# Patient Record
Sex: FEMALE | Race: WHITE | NOT HISPANIC OR LATINO | Employment: OTHER | URBAN - METROPOLITAN AREA
[De-identification: names, ages, dates, MRNs, and addresses within clinical notes are randomized per-mention and may not be internally consistent; named-entity substitution may affect disease eponyms.]

---

## 2018-08-17 NOTE — PATIENT DISCUSSION
Discussed Results from exam and OCT with DR. Lema today.  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  Condition is not an emergency. Appointment made for Monday for retinal consult.

## 2018-08-20 NOTE — PATIENT DISCUSSION
8/24/18-Explained post-operative information to the patient. The patient will have a red and sore eye for about a week. This is completely normal. If the pain becomes severe, we ask the patient to come back in immediately, 24/7 ER discussed. We provided post-operative drops with instructions and an eye patch for the patient to wear at night. The patient will begin Moxifloxacin four times a day and Pred Forte every two hours. We ask that the patient avoid excess water in the eye when showering and try to avoid swimming. This will help reduce possibility of infection. We ask that the patient wear the eye patch we give them at night while sleeping to avoid abrasions and excess rubbing of the eye while sleeping. We ask the patient to sleep on their side or stomach while in bed for the first night so the air bubble is positioned correctly. We also ask that the patient the patient not participate in strenuous activity for the next few weeks and not lift more than 20 lbs for another week or two.

## 2018-08-20 NOTE — PATIENT DISCUSSION
RD is present. Options from observation, surgery and second opinion discussed. Risks including not limited to infection, bleeding, glaucoma, cataract, IOL damage, PVR, retinal scars, multiple surgeries, chronic pain, cosmetic changes, loss of vision/eye discussed. Recommended surgery to resolve RD. The possibility of multiple surgeries emphasized. Patient wishes to proceed. Risks of infection, bleeding, tear, detachment, ocular irritations and cosmetic changes among others discussed. Thorough hygiene and antibiotic use discussed. Required head positioned discussed.  Surgery scheduled  for 8/23/18, patient needs [facility] transportation.  Information also given for [employer].

## 2018-08-20 NOTE — PATIENT DISCUSSION
Retinal Detachment w/ PVR inferior at 6:00 noted at today’s visit. We recommended the patient have surgery to repair RD. Explained to the patient that PPV (23G), MP, ICG, EL, AFGE  will take about 75 minutes. We explained the process of repairing the detachment as well as MAC and Block anesthetics. We asked the patient whether they have had any major heart/lung/strokes/seizures etc. within the past 6-12 months that would require medical clearance from their PCP. The patient states they have not. We asked whether the patient is currently on any blood thinners. They state they are not. Informational pamphlets about Retinal Detachment, Vitrectomy surgeries as well as a handout on what to expect pre-/post-operation where handed to the patient today as well as appointment cards for their upcoming surgery. The patient will need [employer] transportation the day of surgery. Post-op drops were placed into the patients pharmacy today. H&P:   8/20/18 @ 2:30  ... Surgery: 8/23/18  . .. 1 Day PO: 8/24/18  @ 7:30.

## 2018-08-23 NOTE — PATIENT DISCUSSION
Explained post-operative information to the patient. The patient will have a red and sore eye for about a week. This is completely normal. If the pain becomes severe, we ask the patient to come back in immediately, 24/7 ER discussed. We provided post-operative drops with instructions and an eye patch for the patient to wear at night. The patient will begin Moxifloxacin four times a day, Durezol four times a day, Cyclo two times a day and Medrol dose marsha as directed. The patient will need to be in head down positioning for 55 minutes out of every hour for the next week. The presence of an intraocular gas bubble means the patient will not be able to fly until the bubble has dissipated and the patient has been cleared. This will take about 2-2.5 months. We ask that the patient avoid excess water in the eye when showering and try to avoid swimming. This will help reduce possibility of infection. We ask that the patient wear the eye patch we give them at night while sleeping to avoid abrasions and excess rubbing of the eye while sleeping. We ask the patient to sleep on their side or stomach while in bed so the gas bubble is positioned correctly. We also ask that the patient the patient not participate in strenuous activity for the next few weeks and not lift more than 20 lbs for another week or two.

## 2018-08-29 NOTE — PATIENT DISCUSSION
Explained post-operative information to the patient. The patient will have a red and sore eye for about a week. This is completely normal. If the pain becomes severe, we ask the patient to come back in immediately, 24/7 ER discussed. We provided post-operative drop instructions and an eye patch for the patient to wear at night. The patient instructed to continue Moxifloxacin four times a day, Durezol four times a day, Cyclo two times a day. The patient will need to continue to be in head down positioning for 55 minutes out of every hour for the next week until we see him on Friday morning. The presence of an intraocular gas bubble means the patient will not be able to fly until the bubble has dissipated and the patient has been cleared. This will take about 2-2.5 months. We ask that the patient avoid excess water in the eye when showering and try to avoid swimming. This will help reduce possibility of infection. We ask that the patient wear the eye patch we give them at night while sleeping to avoid abrasions and excess rubbing of the eye while sleeping. We ask the patient to sleep on their side or stomach while in bed so the gas bubble is positioned correctly. We also ask that the patient the patient not participate in strenuous activity for the next few weeks and not lift more than 20 lbs for another week or two. Discussed with patient the possibility of doing additional laser treatment Friday morning.

## 2018-08-31 NOTE — PATIENT DISCUSSION
80% gas fill today. Recs additional laser today to INF TEMP area of lattice, patient elects to proceed. Patient was told to see on Left side. D/C cyclogyl and moxifloxacin, continue durezol QID OD, durezol sample given today.

## 2018-08-31 NOTE — PROCEDURE NOTE: CLINICAL
PROCEDURE NOTE: Laser for Retinal Tear OD. Diagnosis: S/P PPV RD Repair. Anesthesia: Topical. Prior to laser, risks/benefits/alternatives to laser discussed including loss of vision, decreased peripheral and night vision, need for more laser and/or surgery and patient wished to proceed. A written consent is on file, and the need for today’s laser was discussed and the patient is understanding and wishes to proceed. Laser Lens: super quad. Wavelength: Argon Green. Spot size: 100 um. Pulse power: 320 mW. Number of pulses: 36. Patient tolerated procedure well. There were no complications. Post-op instructions given. Patient given office phone number/answering service number and advised to call immediately should there be loss of vision or pain, or should they have any other questions or concerns. Grecia York

## 2018-09-13 NOTE — PATIENT DISCUSSION
Reviewed changes in post-operative eye drops with the patient.  Will begin to taper Durezol, TID times 2 weeks, BID times times 2 weeks then QDAY times 2 weeks.  Removed sutures today, patient to start Moxi QID times 4 days then stop.  We will see him in 1 month for PO exam.

## 2018-10-12 NOTE — PATIENT DISCUSSION
Reviewed changes in post-operative eye drops with the patient. Patient to use Durezol QD times 2 weeks then stop.  We will see him on 11/2/18 [facility] AM for PO exam. Pt was told to sleep on side or stomach.

## 2018-10-12 NOTE — PROCEDURE NOTE: CLINICAL
PROCEDURE NOTE: Repair of Retinal Detachment, 1 or More Sessions OD. Diagnosis: S/P PPV RD Repair. Prior to laser, risks/benefits/alternatives to laser discussed including loss of vision, decreased peripheral and night vision, need for more laser and/or surgery and patient wished to proceed. A written consent is on file, and the need for today’s laser was discussed and the patient is understanding and wishes to proceed. Laser Lens: super quad. Wavelength: Argon Green. Spot size: 200 um. Pulse power: 340 mW. Number of pulses: 40. Patient tolerated procedure well. There were no complications. Post-op instructions given. Patient given office phone number/answering service number and advised to call immediately should there be loss of vision or pain, or should they have any other questions or concerns. Dima Elias

## 2018-10-12 NOTE — PATIENT DISCUSSION
Will add additional laser today, superior barricade. R/B/As of laser discussed with patient, patient elects to proceed.

## 2018-11-02 NOTE — PATIENT DISCUSSION
Doing well, retina attached, good IOP with no signs of endophthalmitis. Will have patient see Dr. Kamron Isaac once all healed to have him perform a YAG.

## 2018-11-02 NOTE — PATIENT DISCUSSION
PCO is visually significant will have the patient follow up with Dr. Raza Borden in 2 months once he is fully healed.

## 2018-11-02 NOTE — PATIENT DISCUSSION
Pt was told to sleep on side or stomach. Avoid exposure of the eye to non-sterile fluid such as shower/bath water. Advised against air travel while intraocular gas bubble is present.

## 2018-11-30 NOTE — PATIENT DISCUSSION
Doing well, retina attached, good IOP with no signs of endophthalmitis. Will have patient see Dr. Ifeanyi Carter once all healed to have him perform a YAG in 01/2019.

## 2018-11-30 NOTE — PATIENT DISCUSSION
Post op related.  Stable, continue Durezol-decrease to bid, start BromSite BID, recommend Triesence injection today.  Follow up in 3 weeks.

## 2018-11-30 NOTE — PATIENT DISCUSSION
PCO is visually significant will have the patient follow up with Dr. Fariha Mccarthy in 2 months-01/2019- once he is fully healed.

## 2018-11-30 NOTE — PATIENT DISCUSSION
Recommended Triesence injection today. The injection was given and tolerated well by patient. Post-injection instructions were reviewed and understood by the patient.

## 2018-11-30 NOTE — PROCEDURE NOTE: CLINICAL
PROCEDURE NOTE: Intravitreal Triesence OD. Diagnosis: CME. Anesthesia: [place]. Prep: Betadine Drops and Betadine Scrub. Prior to injection, risks/benefits/alternatives discussed including infection, loss of vision, hemorrhage, cataract, glaucoma, retinal tears or detachment. The off-label status of Intravitreal Triesence also was reviewed. The patient wished to proceed with treatment. Betadine prep was performed. Intravitreal injection of Triescence 4.0 mg/0.10 ml was given. Injection site: 3-4 mm from the limbus in the inferotemporal quadrant. Patient tolerated procedure well. There were no complications. Central retinal artery was perfused after injection. Post injection instructions given. Tracking # *. Lot #: [id number]. Expiration Date: Sat Feb 29 2020 00:00:00 [facility]05[facility] [place] Standard Time). Inventory Id: null. Patient given office phone number/answering service number and advised to call immediately should there be an increase in floaters or redness, loss of vision or pain, or should they have any other questions or concerns. David Henry

## 2018-12-21 NOTE — PATIENT DISCUSSION
Post op related.  Decreased CME, improved, stop Durezol, start Pred QID, continue BromSite BID, follow up in 5-6 weeks.

## 2019-01-10 NOTE — PATIENT DISCUSSION
Doing well, retina attached, good IOP with no signs of endophthalmitis. Will have patient see Dr. Hitesh Obregon once all healed to have him perform a YAG in 01/2019.

## 2019-01-10 NOTE — PATIENT DISCUSSION
Doing well, retina attached, good IOP with no signs of endophthalmitis. Will have patient see Dr. Shravan Caro once all healed to have him perform a YAG in 01/2019.

## 2019-01-10 NOTE — PROCEDURE NOTE: SURGICAL
<p>Prior to commencing surgery patient identification, surgical procedure, site, and side were confirmed by Dr. Alice Solano. Following topical proparacaine anesthesia, the patient was positioned at the YAG laser, a contact lens coupled to the cornea with methylcellulose and an axial posterior capsulotomy performed without complication using 3.1 Mj x 20. Excess methylcellulose was washed from the eye, one drop of Alphagan was instilled and the patient returned to the holding area having tolerated the procedure well and without complication. </p><p>MRN [id number]</p>

## 2019-01-16 NOTE — PATIENT DISCUSSION
Doing well, retina attached, good IOP with no signs of endophthalmitis. Will have patient see Dr. Olman Vidales once all healed to have him perform a YAG in 01/2019.

## 2019-01-25 NOTE — PROCEDURE NOTE: CLINICAL
PROCEDURE NOTE: Intravitreal Triesence OD. Diagnosis: CME. Prior to injection, risks/benefits/alternatives discussed including infection, loss of vision, hemorrhage, cataract, glaucoma, retinal tears or detachment. The off-label status of Intravitreal Triesence also was reviewed. The patient wished to proceed with treatment. Betadine prep was performed. Intravitreal injection of Triescence 4.0 mg/0.10 ml was given. Injection site: 3-4 mm from the limbus in the inferotemporal quadrant. Patient tolerated procedure well. There were no complications. Central retinal artery was perfused after injection. Post injection instructions given. Tracking # *. Lot #: [id number]. Expiration Date: 7194-99-05H22:00:00. Patient given office phone number/answering service number and advised to call immediately should there be an increase in floaters or redness, loss of vision or pain, or should they have any other questions or concerns. Inventory Id: deshawn Bowers

## 2019-01-25 NOTE — PATIENT DISCUSSION
Doing well, retina attached, good IOP with no signs of endophthalmitis. Will have patient see Dr. Ling Tripathi once all healed to have him perform a YAG in 01/2019.

## 2019-01-25 NOTE — PATIENT DISCUSSION
Improved. Will do triesence injection today and see pt back in 6 weeks. Pred Forte qid and Bromsite bid.

## 2019-03-07 NOTE — PATIENT DISCUSSION
Doing well, retina attached, good IOP with no signs of endophthalmitis. Will have patient see Dr. Justino Teresa once all healed to have him perform a YAG in 01/2019.

## 2019-03-07 NOTE — PATIENT DISCUSSION
CME Improved.  Continue Pred qid and Bromsite bid, minimal edema present today.  Follow up in 2 months.

## 2019-05-13 NOTE — PATIENT DISCUSSION
CME Improved.  Continue Pred qid and Bromsite bid, minimal edema present today.  Follow up in 6 weeks.

## 2019-09-05 NOTE — PATIENT DISCUSSION
Edema has improved, some retinoschesis, borderline visually significant, smiddy for 2nd opinion. Unlikely significant benefit.

## 2019-09-05 NOTE — PATIENT DISCUSSION
Trace CME. Pt to use bromsite till gone & taper pred 3gtts for 2W, 2gtts for 2W, 1gtts for 2W then stop.

## 2019-10-15 NOTE — PATIENT DISCUSSION
Post-op instructions given. Discussed drops, start maxitrol QID,  positioning, no strenuous activity and eye protection. Call immediately if eye pain or loss of vision.

## 2019-10-18 NOTE — PATIENT DISCUSSION
Post-op instructions given. Discussed drops, continue maxitrol QID until gone then start Pred qid. Call immediately if eye pain or loss of vision.

## 2019-11-08 NOTE — PATIENT DISCUSSION
Post-op instructions given. Discussed drops, continue pred QID. Call immediately if eye pain or loss of vision.

## 2020-01-09 NOTE — PATIENT DISCUSSION
Post-op instructions given. Discussed drops-Taper Pred TID, BID, QDAY at 2 week intervals then sto. Call immediately if eye pain or loss of vision.

## 2021-11-29 ENCOUNTER — CATARACT EVALUATION (OUTPATIENT)
Dept: URBAN - METROPOLITAN AREA CLINIC 43 | Facility: CLINIC | Age: 71
End: 2021-11-29

## 2021-11-29 DIAGNOSIS — H25.812: ICD-10-CM

## 2021-11-29 DIAGNOSIS — H40.013: ICD-10-CM

## 2021-11-29 DIAGNOSIS — H04.123: ICD-10-CM

## 2021-11-29 DIAGNOSIS — H43.813: ICD-10-CM

## 2021-11-29 DIAGNOSIS — H35.433: ICD-10-CM

## 2021-11-29 DIAGNOSIS — H25.811: ICD-10-CM

## 2021-11-29 PROCEDURE — 92025AV CORNEAL TOPOGRAPHY, AV

## 2021-11-29 PROCEDURE — 92250 FUNDUS PHOTOGRAPHY W/I&R: CPT

## 2021-11-29 PROCEDURE — 99204 OFFICE O/P NEW MOD 45 MIN: CPT

## 2021-11-29 PROCEDURE — 92133 CPTRZD OPH DX IMG PST SGM ON: CPT

## 2021-11-29 PROCEDURE — 92286 ANT SGM IMG I&R SPECLR MIC: CPT

## 2021-11-29 PROCEDURE — 92134 CPTRZ OPH DX IMG PST SGM RTA: CPT

## 2021-11-29 PROCEDURE — V2799PMN IMPRIMIS PRED-MOXI-NEPAF 5ML

## 2021-11-29 PROCEDURE — 92136TC INTERFEROMETRY - TECHNICAL COMPONENT

## 2021-11-29 RX ORDER — AMOXICILLIN 500 MG/1: 1 CAPSULE ORAL

## 2021-11-29 ASSESSMENT — VISUAL ACUITY
OD_SC: CF 6FT
OS_CC: J8
OD_CC: >J12
OS_CC: 20/200
OD_CC: 20/70-2
OD_SC: J1 @ 6"
OS_SC: CF 6FT
OS_SC: J1 @ 4"

## 2021-11-29 ASSESSMENT — TONOMETRY
OD_IOP_MMHG: 21
OS_IOP_MMHG: 20

## 2021-12-09 ENCOUNTER — TECH ONLY (OUTPATIENT)
Dept: URBAN - METROPOLITAN AREA CLINIC 39 | Facility: CLINIC | Age: 71
End: 2021-12-09

## 2021-12-09 ENCOUNTER — SURGERY/PROCEDURE (OUTPATIENT)
Dept: URBAN - METROPOLITAN AREA CLINIC 43 | Facility: CLINIC | Age: 71
End: 2021-12-09

## 2021-12-09 ENCOUNTER — ESTABLISHED PATIENT (OUTPATIENT)
Dept: URBAN - METROPOLITAN AREA SURGERY 14 | Facility: SURGERY | Age: 71
End: 2021-12-09

## 2021-12-09 DIAGNOSIS — H25.811: ICD-10-CM

## 2021-12-09 DIAGNOSIS — H04.123: ICD-10-CM

## 2021-12-09 DIAGNOSIS — H40.013: ICD-10-CM

## 2021-12-09 DIAGNOSIS — H25.812: ICD-10-CM

## 2021-12-09 DIAGNOSIS — H43.813: ICD-10-CM

## 2021-12-09 DIAGNOSIS — H35.433: ICD-10-CM

## 2021-12-09 DIAGNOSIS — Z96.1: ICD-10-CM

## 2021-12-09 DIAGNOSIS — H57.03: ICD-10-CM

## 2021-12-09 DIAGNOSIS — Z97.3: ICD-10-CM

## 2021-12-09 PROCEDURE — 66982AV COMPLEX CATARACT WITH ADVANCED IOL

## 2021-12-09 PROCEDURE — 65772LRI LRI DURING CAT SX

## 2021-12-09 PROCEDURE — 99211T TECH SERVICE

## 2021-12-09 PROCEDURE — 99211HP H&P OFFICE/OUTPATIENT VISIT, EST

## 2021-12-09 PROCEDURE — 66999LNSR LENSAR LASER FOR CAT SX

## 2021-12-09 ASSESSMENT — VISUAL ACUITY: OS_SC: 20/40+1

## 2021-12-09 ASSESSMENT — TONOMETRY: OS_IOP_MMHG: 14

## 2021-12-15 ENCOUNTER — POST-OP (OUTPATIENT)
Dept: URBAN - METROPOLITAN AREA CLINIC 35 | Facility: CLINIC | Age: 71
End: 2021-12-15

## 2021-12-15 DIAGNOSIS — H26.492: ICD-10-CM

## 2021-12-15 DIAGNOSIS — Z96.1: ICD-10-CM

## 2021-12-15 PROCEDURE — 99024 POSTOP FOLLOW-UP VISIT: CPT

## 2021-12-15 ASSESSMENT — VISUAL ACUITY
OS_SC: 20/60-2
OS_PH: 20/50-1

## 2021-12-15 ASSESSMENT — TONOMETRY
OD_IOP_MMHG: 19
OS_IOP_MMHG: 21

## 2021-12-22 ENCOUNTER — POST-OP (OUTPATIENT)
Dept: URBAN - METROPOLITAN AREA CLINIC 35 | Facility: CLINIC | Age: 71
End: 2021-12-22

## 2021-12-22 DIAGNOSIS — Z96.1: ICD-10-CM

## 2021-12-22 PROCEDURE — 99024 POSTOP FOLLOW-UP VISIT: CPT

## 2021-12-22 ASSESSMENT — VISUAL ACUITY
OS_SC: 20/60-1
OS_PH: 20/50-2
OS_SC: J4-

## 2021-12-22 ASSESSMENT — TONOMETRY
OS_IOP_MMHG: 24
OD_IOP_MMHG: 15

## 2022-01-11 ENCOUNTER — DIAGNOSTICS ONLY (OUTPATIENT)
Dept: URBAN - METROPOLITAN AREA CLINIC 43 | Facility: CLINIC | Age: 72
End: 2022-01-11

## 2022-01-11 DIAGNOSIS — H04.123: ICD-10-CM

## 2022-01-11 DIAGNOSIS — H25.812: ICD-10-CM

## 2022-01-11 DIAGNOSIS — H35.433: ICD-10-CM

## 2022-01-11 DIAGNOSIS — H40.013: ICD-10-CM

## 2022-01-11 DIAGNOSIS — H25.811: ICD-10-CM

## 2022-01-11 DIAGNOSIS — Z97.3: ICD-10-CM

## 2022-01-11 DIAGNOSIS — H43.813: ICD-10-CM

## 2022-01-11 PROCEDURE — 99211T TECH SERVICE

## 2022-01-11 PROCEDURE — 92025 CPTRIZED CORNEAL TOPOGRAPHY: CPT

## 2022-01-11 PROCEDURE — 92136TC INTERFEROMETRY - TECHNICAL COMPONENT

## 2022-08-15 ENCOUNTER — CONSULTATION/EVALUATION (OUTPATIENT)
Dept: URBAN - METROPOLITAN AREA CLINIC 39 | Facility: CLINIC | Age: 72
End: 2022-08-15

## 2022-08-15 ENCOUNTER — SURGERY/PROCEDURE (OUTPATIENT)
Dept: URBAN - METROPOLITAN AREA SURGERY 14 | Facility: SURGERY | Age: 72
End: 2022-08-15

## 2022-08-15 DIAGNOSIS — H25.811: ICD-10-CM

## 2022-08-15 DIAGNOSIS — H26.492: ICD-10-CM

## 2022-08-15 DIAGNOSIS — H43.813: ICD-10-CM

## 2022-08-15 DIAGNOSIS — H04.123: ICD-10-CM

## 2022-08-15 DIAGNOSIS — Z96.1: ICD-10-CM

## 2022-08-15 DIAGNOSIS — H40.013: ICD-10-CM

## 2022-08-15 DIAGNOSIS — H35.433: ICD-10-CM

## 2022-08-15 PROCEDURE — 92025AV CORNEAL TOPOGRAPHY, AV

## 2022-08-15 PROCEDURE — 99214 OFFICE O/P EST MOD 30 MIN: CPT

## 2022-08-15 PROCEDURE — V2799PMN IMPRIMIS PRED-MOXI-NEPAF 5ML

## 2022-08-15 PROCEDURE — 92136TC INTERFEROMETRY - TECHNICAL COMPONENT

## 2022-08-15 PROCEDURE — 92133 CPTRZD OPH DX IMG PST SGM ON: CPT

## 2022-08-15 PROCEDURE — 66821 AFTER CATARACT LASER SURGERY: CPT

## 2022-08-15 PROCEDURE — 92015 DETERMINE REFRACTIVE STATE: CPT

## 2022-08-15 ASSESSMENT — VISUAL ACUITY
OD_RAM: 20/20-1
OD_SC: CF 3FT
OS_BAT: 20/70
OD_BAT: 20/100
OS_SC: 20/50-2
OS_SC: J3

## 2022-08-15 ASSESSMENT — TONOMETRY
OD_IOP_MMHG: 17
OS_IOP_MMHG: 17

## 2022-08-25 ENCOUNTER — POST-OP (OUTPATIENT)
Dept: URBAN - METROPOLITAN AREA CLINIC 35 | Facility: CLINIC | Age: 72
End: 2022-08-25

## 2022-08-25 DIAGNOSIS — Z96.1: ICD-10-CM

## 2022-08-25 DIAGNOSIS — Z98.890: ICD-10-CM

## 2022-08-25 DIAGNOSIS — Z97.3: ICD-10-CM

## 2022-08-25 DIAGNOSIS — H04.123: ICD-10-CM

## 2022-08-25 PROCEDURE — 99024 POSTOP FOLLOW-UP VISIT: CPT

## 2022-08-25 ASSESSMENT — VISUAL ACUITY
OS_SC: 20/50+1
OD_SC: J1 @ 5"
OD_SC: CF 3FT
OS_PH: 20/40+1
OS_SC: J3

## 2022-08-25 ASSESSMENT — TONOMETRY
OS_IOP_MMHG: 14
OD_IOP_MMHG: 14

## 2022-08-29 ENCOUNTER — SURGERY/PROCEDURE (OUTPATIENT)
Dept: URBAN - METROPOLITAN AREA CLINIC 39 | Facility: CLINIC | Age: 72
End: 2022-08-29

## 2022-08-29 ENCOUNTER — PRE-OP/H&P (OUTPATIENT)
Dept: URBAN - METROPOLITAN AREA SURGERY 14 | Facility: SURGERY | Age: 72
End: 2022-08-29

## 2022-08-29 DIAGNOSIS — H25.811: ICD-10-CM

## 2022-08-29 DIAGNOSIS — H43.813: ICD-10-CM

## 2022-08-29 DIAGNOSIS — H40.013: ICD-10-CM

## 2022-08-29 DIAGNOSIS — H35.433: ICD-10-CM

## 2022-08-29 DIAGNOSIS — H04.123: ICD-10-CM

## 2022-08-29 PROCEDURE — 66984AV REMOVE CATARACT, INSERT ADVANCED LENS

## 2022-08-29 PROCEDURE — 99211T TECH SERVICE

## 2022-08-29 PROCEDURE — 66999LNSR LENSAR LASER FOR CAT SX

## 2022-08-30 ENCOUNTER — POST-OP (OUTPATIENT)
Dept: URBAN - METROPOLITAN AREA CLINIC 35 | Facility: CLINIC | Age: 72
End: 2022-08-30

## 2022-08-30 DIAGNOSIS — Z96.1: ICD-10-CM

## 2022-08-30 PROCEDURE — 99024 POSTOP FOLLOW-UP VISIT: CPT

## 2022-08-30 ASSESSMENT — VISUAL ACUITY
OS_SC: 20/40
OD_SC: 20/50
OD_PH: 20/40+2

## 2022-08-30 ASSESSMENT — TONOMETRY
OS_IOP_MMHG: 14
OD_IOP_MMHG: 14

## 2022-09-14 ENCOUNTER — POST-OP (OUTPATIENT)
Dept: URBAN - METROPOLITAN AREA CLINIC 35 | Facility: CLINIC | Age: 72
End: 2022-09-14

## 2022-09-14 DIAGNOSIS — Z96.1: ICD-10-CM

## 2022-09-14 PROCEDURE — 99024 POSTOP FOLLOW-UP VISIT: CPT

## 2022-09-14 ASSESSMENT — VISUAL ACUITY
OS_PH: 20/40
OD_SC: J4
OU_SC: J2+
OS_SC: 20/40-2
OU: J3
OS: J3
OS_SC: J2+
OD_SC: 20/30-2
OU_SC: 20/30-2
OD: J3

## 2022-09-14 ASSESSMENT — TONOMETRY
OS_IOP_MMHG: 14
OD_IOP_MMHG: 14

## 2022-12-01 ENCOUNTER — COMPREHENSIVE EXAM (OUTPATIENT)
Dept: URBAN - METROPOLITAN AREA CLINIC 35 | Facility: CLINIC | Age: 72
End: 2022-12-01

## 2022-12-01 PROCEDURE — 92014 COMPRE OPH EXAM EST PT 1/>: CPT

## 2022-12-01 ASSESSMENT — TONOMETRY
OS_IOP_MMHG: 14
OD_IOP_MMHG: 12

## 2022-12-01 ASSESSMENT — VISUAL ACUITY
OS_SC: 20/60
OD_SC: J2
OS_SC: J2
OD_SC: 20/30

## 2023-04-04 ENCOUNTER — COMPREHENSIVE EXAM (OUTPATIENT)
Dept: URBAN - METROPOLITAN AREA CLINIC 35 | Facility: CLINIC | Age: 73
End: 2023-04-04

## 2023-04-04 DIAGNOSIS — Z96.1: ICD-10-CM

## 2023-04-04 DIAGNOSIS — H35.433: ICD-10-CM

## 2023-04-04 DIAGNOSIS — H04.123: ICD-10-CM

## 2023-04-04 DIAGNOSIS — H26.491: ICD-10-CM

## 2023-04-04 DIAGNOSIS — H52.7: ICD-10-CM

## 2023-04-04 DIAGNOSIS — H25.811: ICD-10-CM

## 2023-04-04 DIAGNOSIS — Z98.890: ICD-10-CM

## 2023-04-04 DIAGNOSIS — H43.813: ICD-10-CM

## 2023-04-04 DIAGNOSIS — H40.013: ICD-10-CM

## 2023-04-04 PROCEDURE — 92014 COMPRE OPH EXAM EST PT 1/>: CPT

## 2023-04-04 PROCEDURE — 92015 DETERMINE REFRACTIVE STATE: CPT

## 2023-04-04 ASSESSMENT — VISUAL ACUITY
OS_SC: J4
OS_SC: 20/60
OD_BAT: 20/40 W/ MR
OS_PH: 20/40-2
OD_PH: 20/25+1
OD_SC: J12
OD_SC: 20/40+2

## 2023-04-04 ASSESSMENT — TONOMETRY
OS_IOP_MMHG: 11
OD_IOP_MMHG: 11

## 2023-11-22 NOTE — PATIENT DISCUSSION
Recommended observation. Terbinafine Counseling: Patient counseling regarding adverse effects of terbinafine including but not limited to headache, diarrhea, rash, upset stomach, liver function test abnormalities, itching, taste/smell disturbance, nausea, abdominal pain, and flatulence.  There is a rare possibility of liver failure that can occur when taking terbinafine.  The patient understands that a baseline LFT and kidney function test may be required. The patient verbalized understanding of the proper use and possible adverse effects of terbinafine.  All of the patient's questions and concerns were addressed.

## 2024-06-24 NOTE — PATIENT DISCUSSION
Retinal detachment noted RD, see #1. Detail Level: Detailed Depth Of Biopsy: dermis Was A Bandage Applied: Yes Size Of Lesion In Cm: 0 Biopsy Type: H and E Biopsy Method: Dermablade Anesthesia Type: 1% lidocaine with 1:200,000 epinephrine Anesthesia Volume In Cc: 1.2 Hemostasis: Electrocautery and Aluminum Chloride Wound Care: Vaseline Dressing: bandage Destruction After The Procedure: No Type Of Destruction Used: Curettage Cryotherapy Text: The wound bed was treated with cryotherapy after the biopsy was performed. Electrodesiccation Text: The wound bed was treated with electrodesiccation after the biopsy was performed. Electrodesiccation And Curettage Text: The wound bed was treated with electrodesiccation and curettage after the biopsy was performed. Silver Nitrate Text: The wound bed was treated with silver nitrate after the biopsy was performed. Lab: [id number] Lab Facility: [id number] Notes (To The Dermatopathologist): Dr. Moore Consent: Written consent was obtained and risks were reviewed including but not limited to scarring, infection, bleeding, scabbing, incomplete removal, nerve damage and allergy to anesthesia. Post-Care Instructions: I reviewed with the patient in detail post-care instructions. Patient is to keep the biopsy site dry for 24 hours, and then cleanse area with soap and water and petrolatum twice daily until healed. Patient may apply hydrogen peroxide soaks to remove any crusting. Notification Instructions: Patient will be notified of biopsy results. However, patient instructed to call the office if not contacted within 2 weeks. Billing Type: Third-Party Bill Information: Selecting Yes will display possible errors in your note based on the variables you have selected. This validation is only offered as a suggestion for you. PLEASE NOTE THAT THE VALIDATION TEXT WILL BE REMOVED WHEN YOU FINALIZE YOUR NOTE. IF YOU WANT TO FAX A PRELIMINARY NOTE YOU WILL NEED TO TOGGLE THIS TO 'NO' IF YOU DO NOT WANT IT IN YOUR FAXED NOTE.